# Patient Record
Sex: FEMALE | Race: ASIAN | NOT HISPANIC OR LATINO | Employment: FULL TIME | URBAN - METROPOLITAN AREA
[De-identification: names, ages, dates, MRNs, and addresses within clinical notes are randomized per-mention and may not be internally consistent; named-entity substitution may affect disease eponyms.]

---

## 2021-08-27 PROBLEM — Z37.9 NORMAL LABOR: Status: ACTIVE | Noted: 2021-08-27

## 2021-11-11 PROBLEM — R51.9 HEADACHE: Status: ACTIVE | Noted: 2020-06-12

## 2021-11-11 PROBLEM — J30.9 ALLERGIC RHINITIS: Status: ACTIVE | Noted: 2021-11-11

## 2022-12-05 ENCOUNTER — APPOINTMENT (OUTPATIENT)
Dept: GENERAL RADIOLOGY | Facility: HOSPITAL | Age: 27
End: 2022-12-05

## 2022-12-05 ENCOUNTER — HOSPITAL ENCOUNTER (EMERGENCY)
Facility: HOSPITAL | Age: 27
Discharge: HOME | End: 2022-12-05
Attending: EMERGENCY MEDICINE | Admitting: EMERGENCY MEDICINE

## 2022-12-05 VITALS
DIASTOLIC BLOOD PRESSURE: 62 MMHG | HEART RATE: 103 BPM | TEMPERATURE: 98.8 F | OXYGEN SATURATION: 99 % | RESPIRATION RATE: 18 BRPM | SYSTOLIC BLOOD PRESSURE: 101 MMHG | BODY MASS INDEX: 22.94 KG/M2 | HEIGHT: 60 IN | WEIGHT: 116.84 LBS

## 2022-12-05 DIAGNOSIS — J06.9 UPPER RESPIRATORY TRACT INFECTION, UNSPECIFIED TYPE: Primary | ICD-10-CM

## 2022-12-05 LAB
FLUAV AG NPH QL: NEGATIVE
FLUBV AG NPH QL IA: NEGATIVE
S PYO AG THROAT QL: NEGATIVE
SARS-COV-2 RNA PNL SPEC NAA+PROBE: NOT DETECTED

## 2022-12-05 PROCEDURE — 87081 CULTURE SCREEN ONLY: CPT | Performed by: EMERGENCY MEDICINE

## 2022-12-05 PROCEDURE — 87804 INFLUENZA ASSAY W/OPTIC: CPT | Performed by: EMERGENCY MEDICINE

## 2022-12-05 PROCEDURE — 87880 STREP A ASSAY W/OPTIC: CPT | Performed by: EMERGENCY MEDICINE

## 2022-12-05 PROCEDURE — U0004 COV-19 TEST NON-CDC HGH THRU: HCPCS | Performed by: EMERGENCY MEDICINE

## 2022-12-05 PROCEDURE — 99283 EMERGENCY DEPT VISIT LOW MDM: CPT

## 2022-12-05 PROCEDURE — 71045 X-RAY EXAM CHEST 1 VIEW: CPT

## 2022-12-05 RX ORDER — ACETAMINOPHEN 500 MG
500 TABLET ORAL ONCE
Status: COMPLETED | OUTPATIENT
Start: 2022-12-05 | End: 2022-12-05

## 2022-12-05 RX ORDER — ALBUTEROL SULFATE 90 UG/1
2 AEROSOL, METERED RESPIRATORY (INHALATION) 4 TIMES DAILY PRN
Qty: 18 G | Refills: 0 | Status: SHIPPED | OUTPATIENT
Start: 2022-12-05

## 2022-12-05 RX ORDER — GUAIFENESIN AND CODEINE PHOSPHATE 100; 10 MG/5ML; MG/5ML
5 SOLUTION ORAL 4 TIMES DAILY PRN
Qty: 118 ML | Refills: 0 | Status: SHIPPED | OUTPATIENT
Start: 2022-12-05

## 2022-12-05 RX ADMIN — ACETAMINOPHEN 500 MG: 500 TABLET, FILM COATED ORAL at 02:45

## 2022-12-05 NOTE — ED PROVIDER NOTES
Time: 6:52 AM EST    Chief Complaint: Fever  History provided by: Sidra  History is limited by: N/A     History of Present Illness:      Patient is a 37 y.o. year old female who presents to the emergency department with a fever. Patient reports that she returned from [facility] about 6 weeks ago. Patient reports body aches and a fever that has lasted couple days.      History provided by:  Patient   used: No        Similar Symptoms Previously: N/A  Recently seen: N/A      Patient Care Team  Primary Care Provider: Provider, No Known    Past Medical History:     No Known Allergies  History reviewed. No pertinent past medical history.  Past Surgical History:   Procedure Laterality Date   • HEAD/NECK LESION/CYST EXCISION       History reviewed. No pertinent family history.    Home Medications:  Prior to Admission medications    Not on File        Social History:   Social History     Tobacco Use   • Smoking status: Never   • Smokeless tobacco: Never   Substance Use Topics   • Alcohol use: Not Currently   • Drug use: Never         Review of Systems:  Review of Systems   Constitutional: Positive for fever. Negative for chills.   HENT: Negative for congestion, rhinorrhea and sore throat.    Eyes: Negative for pain and visual disturbance.   Respiratory: Negative for apnea, cough, chest tightness and shortness of breath.    Cardiovascular: Negative for chest pain and palpitations.   Gastrointestinal: Negative for abdominal pain, diarrhea, nausea and vomiting.   Genitourinary: Negative for difficulty urinating and dysuria.   Musculoskeletal: Positive for myalgias. Negative for joint swelling.   Skin: Negative for color change.   Neurological: Negative for seizures and headaches.   Psychiatric/Behavioral: Negative.    All other systems reviewed and are negative.       Physical Exam:  /73 (BP Location: Left arm, Patient Position: Sitting)   Pulse (!) 123   Temp (!) 100.8 °F (38.2 °C) (Oral)   Resp  "18   Ht 152.4 cm (60\")   Wt 53 kg (116 lb 13.5 oz)   LMP 11/28/2022 (Approximate)   SpO2 99%   BMI 22.82 kg/m²     Physical Exam  Vitals and nursing note reviewed.   Constitutional:       General: She is not in acute distress.     Appearance: Normal appearance. She is not toxic-appearing.   HENT:      Head: Normocephalic and atraumatic.      Jaw: There is normal jaw occlusion.   Eyes:      General: Lids are normal.      Extraocular Movements: Extraocular movements intact.      Conjunctiva/sclera: Conjunctivae normal.      Pupils: Pupils are equal, round, and reactive to light.   Cardiovascular:      Rate and Rhythm: Normal rate and regular rhythm.      Pulses: Normal pulses.      Heart sounds: Normal heart sounds.   Pulmonary:      Effort: Pulmonary effort is normal. No respiratory distress.      Breath sounds: Normal breath sounds. No wheezing or rhonchi.   Abdominal:      General: Abdomen is flat.      Palpations: Abdomen is soft.      Tenderness: There is no abdominal tenderness. There is no guarding or rebound.   Musculoskeletal:         General: Normal range of motion.      Cervical back: Normal range of motion and neck supple.      Right lower leg: No edema.      Left lower leg: No edema.   Skin:     General: Skin is warm and dry.   Neurological:      Mental Status: She is alert and oriented to person, place, and time. Mental status is at baseline.   Psychiatric:         Mood and Affect: Mood normal.                Medications in the Emergency Department:  Medications   acetaminophen (TYLENOL) tablet 500 mg (500 mg Oral Given 12/5/22 0245)        Labs  Lab Results (last 24 hours)     Procedure Component Value Units Date/Time    Influenza Antigen, Rapid - Swab, Nasopharynx [[id number]]  (Normal) Collected: 12/05/22 0239    Specimen: Swab from Nasopharynx Updated: 12/05/22 0323     Influenza A Ag, EIA Negative     Influenza B Ag, EIA Negative    COVID-19,APTIMA PANTHER(VICKIE),[facility], NP/OP SWAB IN " UTM/VTM/[facility] [employer] MEDIA,24 HR TAT - Swab, Nasopharynx [[id number]]  (Normal) Collected: 12/05/22 0239    Specimen: Swab from Nasopharynx Updated: 12/05/22 0628     COVID19 Not Detected    Narrative:      Fact sheet for providers: https://www.fda.gov/media/799299/download     Fact sheet for patients: https://www.fda.gov/media/889799/download    Test performed by RT PCR.    Rapid Strep A Screen - Swab, Throat [[id number]]  (Normal) Collected: 12/05/22 0531    Specimen: Swab from Throat Updated: 12/05/22 0545     Strep A Ag Negative    Beta Strep Culture, Throat - Swab, Throat [[id number]] Collected: 12/05/22 0531    Specimen: Swab from Throat Updated: 12/05/22 0545           Imaging:  XR Chest 1 View    Result Date: 12/5/2022  PROCEDURE: XR CHEST 1 VW  COMPARISON: None.  INDICATIONS: cough, fever  FINDINGS: A single frontal (AP or PA upright portable) chest radiograph reveals no cardiac enlargement and no acute infiltrate. No pneumothorax is seen.  There is mild lateral curvature of the thoracic spine with the convexity to the left, which may be fixed or positional in nature.       No acute cardiopulmonary disease is seen radiographically.     Please note that portions of this note were completed with a voice recognition program.  VIRY MIJARES JR, MD       Electronically Signed and Approved By: VIRY MIJARES JR, MD on 12/05/2022 at 6:55                Procedures:  Procedures    Progress                            Medical Decision Making:  MDM  Number of Diagnoses or Management Options  Upper respiratory tract infection, unspecified type  Diagnosis management comments: In summary this is a 37-year-old female who presents emergency room complaining of fever, cough, chills.  Chest x-ray is unremarkable.  Influenza test negative.  Strep and COVID-19 test also negative.  Patient is otherwise well-appearing and in no acute distress.  She is oxygenating 100% on room air.  She will be prescribed cough medicine and  inhaler for home treatment.  Very strict return to ER and follow-up instructions have been provided to the patient.         Final diagnoses:   Upper respiratory tract infection, unspecified type        Disposition:  ED Disposition     ED Disposition   Discharge    Condition   Stable    Comment   --             This medical record created using voice recognition software.      Documentation assistance provided by Cain John, acting as scribe for Lan Ayon MD. Information recorded by the scribe was done at my direction and has been verified and validated by me.       Cain John  12/05/22 0701       Lan Ayon MD  12/05/22 0750

## 2022-12-07 LAB — BACTERIA SPEC AEROBE CULT: NORMAL

## 2023-09-08 ENCOUNTER — OFFICE VISIT (OUTPATIENT)
Dept: FAMILY MEDICINE CLINIC | Facility: CLINIC | Age: 28
End: 2023-09-08
Payer: OTHER GOVERNMENT

## 2023-09-08 VITALS
TEMPERATURE: 97.6 F | SYSTOLIC BLOOD PRESSURE: 109 MMHG | WEIGHT: 126 LBS | HEIGHT: 61 IN | DIASTOLIC BLOOD PRESSURE: 70 MMHG | OXYGEN SATURATION: 100 % | HEART RATE: 71 BPM | BODY MASS INDEX: 23.79 KG/M2

## 2023-09-08 DIAGNOSIS — Z00.00 ANNUAL PHYSICAL EXAM: Primary | ICD-10-CM

## 2023-09-08 DIAGNOSIS — Z13.29 SCREENING FOR THYROID DISORDER: ICD-10-CM

## 2023-09-08 DIAGNOSIS — Z01.84 IMMUNITY STATUS TESTING: ICD-10-CM

## 2023-09-08 DIAGNOSIS — Z23 NEED FOR HPV VACCINATION: ICD-10-CM

## 2023-09-08 DIAGNOSIS — Z23 NEED FOR INFLUENZA VACCINATION: ICD-10-CM

## 2023-09-08 DIAGNOSIS — Z11.59 NEED FOR HEPATITIS C SCREENING TEST: ICD-10-CM

## 2023-09-11 ENCOUNTER — LAB (OUTPATIENT)
Dept: LAB | Facility: HOSPITAL | Age: 28
End: 2023-09-11
Payer: OTHER GOVERNMENT

## 2023-09-11 DIAGNOSIS — Z01.84 IMMUNITY STATUS TESTING: ICD-10-CM

## 2023-09-11 LAB
ALBUMIN SERPL-MCNC: 4.2 G/DL (ref 3.5–5.2)
ALBUMIN/GLOB SERPL: 1.2 G/DL
ALP SERPL-CCNC: 82 U/L (ref 39–117)
ALT SERPL W P-5'-P-CCNC: 26 U/L (ref 1–33)
ANION GAP SERPL CALCULATED.3IONS-SCNC: 10.8 MMOL/L (ref 5–15)
AST SERPL-CCNC: 25 U/L (ref 1–32)
BASOPHILS # BLD AUTO: 0.05 10*3/MM3 (ref 0–0.2)
BASOPHILS NFR BLD AUTO: 0.6 % (ref 0–1.5)
BILIRUB SERPL-MCNC: 0.3 MG/DL (ref 0–1.2)
BUN SERPL-MCNC: 10 MG/DL (ref 6–20)
BUN/CREAT SERPL: 13.5 (ref 7–25)
CALCIUM SPEC-SCNC: 9 MG/DL (ref 8.6–10.5)
CHLORIDE SERPL-SCNC: 104 MMOL/L (ref 98–107)
CO2 SERPL-SCNC: 24.2 MMOL/L (ref 22–29)
CREAT SERPL-MCNC: 0.74 MG/DL (ref 0.57–1)
DEPRECATED RDW RBC AUTO: 37.3 FL (ref 37–54)
EGFRCR SERPLBLD CKD-EPI 2021: 115.3 ML/MIN/1.73
EOSINOPHIL # BLD AUTO: 0.7 10*3/MM3 (ref 0–0.4)
EOSINOPHIL NFR BLD AUTO: 7.8 % (ref 0.3–6.2)
ERYTHROCYTE [DISTWIDTH] IN BLOOD BY AUTOMATED COUNT: 11.8 % (ref 12.3–15.4)
GLOBULIN UR ELPH-MCNC: 3.6 GM/DL
GLUCOSE SERPL-MCNC: 81 MG/DL (ref 65–99)
HCT VFR BLD AUTO: 40.9 % (ref 34–46.6)
HCV AB SER DONR QL: NORMAL
HGB BLD-MCNC: 13.6 G/DL (ref 12–15.9)
IMM GRANULOCYTES # BLD AUTO: 0.02 10*3/MM3 (ref 0–0.05)
IMM GRANULOCYTES NFR BLD AUTO: 0.2 % (ref 0–0.5)
LYMPHOCYTES # BLD AUTO: 2.08 10*3/MM3 (ref 0.7–3.1)
LYMPHOCYTES NFR BLD AUTO: 23.2 % (ref 19.6–45.3)
MCH RBC QN AUTO: 29.3 PG (ref 26.6–33)
MCHC RBC AUTO-ENTMCNC: 33.3 G/DL (ref 31.5–35.7)
MCV RBC AUTO: 88.1 FL (ref 79–97)
MONOCYTES # BLD AUTO: 0.58 10*3/MM3 (ref 0.1–0.9)
MONOCYTES NFR BLD AUTO: 6.5 % (ref 5–12)
NEUTROPHILS NFR BLD AUTO: 5.54 10*3/MM3 (ref 1.7–7)
NEUTROPHILS NFR BLD AUTO: 61.7 % (ref 42.7–76)
NRBC BLD AUTO-RTO: 0 /100 WBC (ref 0–0.2)
PLATELET # BLD AUTO: 285 10*3/MM3 (ref 140–450)
PMV BLD AUTO: 11.2 FL (ref 6–12)
POTASSIUM SERPL-SCNC: 3.4 MMOL/L (ref 3.5–5.2)
PROT SERPL-MCNC: 7.8 G/DL (ref 6–8.5)
RBC # BLD AUTO: 4.64 10*6/MM3 (ref 3.77–5.28)
SODIUM SERPL-SCNC: 139 MMOL/L (ref 136–145)
TSH SERPL DL<=0.05 MIU/L-ACNC: 1.61 UIU/ML (ref 0.27–4.2)
WBC NRBC COR # BLD: 8.97 10*3/MM3 (ref 3.4–10.8)

## 2023-09-11 PROCEDURE — 80074 ACUTE HEPATITIS PANEL: CPT | Performed by: NURSE PRACTITIONER

## 2023-09-11 PROCEDURE — 86704 HEP B CORE ANTIBODY TOTAL: CPT

## 2023-09-11 PROCEDURE — 86706 HEP B SURFACE ANTIBODY: CPT

## 2023-09-11 PROCEDURE — 86762 RUBELLA ANTIBODY: CPT

## 2023-09-11 PROCEDURE — 86480 TB TEST CELL IMMUN MEASURE: CPT

## 2023-09-11 PROCEDURE — 84443 ASSAY THYROID STIM HORMONE: CPT | Performed by: NURSE PRACTITIONER

## 2023-09-11 PROCEDURE — 85025 COMPLETE CBC W/AUTO DIFF WBC: CPT | Performed by: NURSE PRACTITIONER

## 2023-09-11 PROCEDURE — 36415 COLL VENOUS BLD VENIPUNCTURE: CPT

## 2023-09-11 PROCEDURE — 80053 COMPREHEN METABOLIC PANEL: CPT | Performed by: NURSE PRACTITIONER

## 2023-09-11 PROCEDURE — 86765 RUBEOLA ANTIBODY: CPT

## 2023-09-11 PROCEDURE — 86735 MUMPS ANTIBODY: CPT

## 2023-09-12 ENCOUNTER — TELEPHONE (OUTPATIENT)
Dept: FAMILY MEDICINE CLINIC | Facility: CLINIC | Age: 28
End: 2023-09-12
Payer: OTHER GOVERNMENT

## 2023-09-12 DIAGNOSIS — E87.6 LOW SERUM POTASSIUM: Primary | ICD-10-CM

## 2023-09-12 DIAGNOSIS — E87.5 SERUM POTASSIUM ELEVATED: Primary | ICD-10-CM

## 2023-09-12 LAB
MEV IGG SER IA-ACNC: >300 AU/ML
MUV IGG SER IA-ACNC: 33.4 AU/ML
RUBV IGG SERPL IA-ACNC: 7.63 INDEX

## 2023-09-13 LAB
GAMMA INTERFERON BACKGROUND BLD IA-ACNC: 0.17 IU/ML
M TB IFN-G BLD-IMP: NEGATIVE
M TB IFN-G CD4+ T-CELLS BLD-ACNC: 0.1 IU/ML
M TBIFN-G CD4+ CD8+T-CELLS BLD-ACNC: 0.12 IU/ML
MITOGEN IGNF BLD-ACNC: >10 IU/ML
QUANTIFERON INCUBATION: NORMAL
SERVICE CMNT-IMP: NORMAL
SPECIMEN STATUS: NORMAL

## 2023-09-14 LAB
HAV AB SER QL IA: POSITIVE
HAV IGM SERPL QL IA: NEGATIVE
HBV CORE AB SERPL QL IA: NEGATIVE
HBV CORE IGM SERPL QL IA: NEGATIVE
HBV SURFACE AB SER QL: NON REACTIVE
HBV SURFACE AG SERPL QL IA: NEGATIVE

## 2024-09-03 ENCOUNTER — CLINICAL SUPPORT (OUTPATIENT)
Dept: FAMILY MEDICINE CLINIC | Facility: CLINIC | Age: 29
End: 2024-09-03
Payer: OTHER GOVERNMENT

## 2024-09-03 DIAGNOSIS — Z11.1 SCREENING-PULMONARY TB: Primary | ICD-10-CM

## 2024-09-03 PROCEDURE — 86580 TB INTRADERMAL TEST: CPT | Performed by: NURSE PRACTITIONER

## 2024-09-05 ENCOUNTER — HOSPITAL ENCOUNTER (OUTPATIENT)
Dept: GENERAL RADIOLOGY | Facility: HOSPITAL | Age: 29
Discharge: HOME | End: 2024-09-05
Admitting: NURSE PRACTITIONER
Payer: OTHER GOVERNMENT

## 2024-09-05 ENCOUNTER — CLINICAL SUPPORT (OUTPATIENT)
Dept: FAMILY MEDICINE CLINIC | Facility: CLINIC | Age: 29
End: 2024-09-05
Payer: OTHER GOVERNMENT

## 2024-09-05 DIAGNOSIS — Z11.1 SCREENING-PULMONARY TB: ICD-10-CM

## 2024-09-05 LAB
INDURATION: 25 MM (ref 0–10)
Lab: ABNORMAL
Lab: ABNORMAL
TB SKIN TEST: ABNORMAL

## 2024-09-05 PROCEDURE — 71046 X-RAY EXAM CHEST 2 VIEWS: CPT

## 2024-10-18 ENCOUNTER — OFFICE VISIT (OUTPATIENT)
Dept: FAMILY MEDICINE CLINIC | Facility: CLINIC | Age: 29
End: 2024-10-18
Payer: OTHER GOVERNMENT

## 2024-10-18 VITALS
SYSTOLIC BLOOD PRESSURE: 100 MMHG | WEIGHT: 131 LBS | BODY MASS INDEX: 24.73 KG/M2 | TEMPERATURE: 98.3 F | HEIGHT: 61 IN | DIASTOLIC BLOOD PRESSURE: 69 MMHG | OXYGEN SATURATION: 99 % | HEART RATE: 76 BPM

## 2024-10-18 DIAGNOSIS — Z00.00 ANNUAL PHYSICAL EXAM: Primary | ICD-10-CM

## 2024-10-18 DIAGNOSIS — Z13.29 SCREENING FOR THYROID DISORDER: ICD-10-CM

## 2024-10-18 DIAGNOSIS — R76.11 POSITIVE TB TEST: ICD-10-CM

## 2024-10-18 PROCEDURE — 99395 PREV VISIT EST AGE 18-39: CPT | Performed by: NURSE PRACTITIONER

## 2024-10-18 RX ORDER — SPIRONOLACTONE 100 MG/1
100 TABLET, FILM COATED ORAL DAILY
COMMUNITY

## 2024-10-30 NOTE — PROGRESS NOTES
"     Female Physical / PAP Note      Patient Name: Rob Winn  : 1995   MRN: [id number]     Chief Complaint:    Chief Complaint   Patient presents with    Gynecologic Exam       History of Present Illness: Rob Winn is a 28 y.o. female who is here today for her annual health maintenance and physical.       Pap- 2021  Lmp- 10/18/2024      Subjective      Review of Systems:   Review of Systems   Constitutional:  Negative for fever.   Respiratory:  Negative for cough.    Cardiovascular:  Negative for chest pain.   Gastrointestinal:  Negative for abdominal pain, constipation, diarrhea, nausea and vomiting.   Genitourinary:  Negative for dysuria and pelvic pain.   Skin:  Negative for rash.      Breast - No tenderness, lumps, discharge, or blood from nipples.      Past Medical History, Social History, Family History and Care Team were all reviewed with patient and updated as appropriate.     Medications:     Current Outpatient Medications:     albuterol sulfate  (90 Base) MCG/ACT inhaler, Inhale 2 puffs 4 (Four) Times a Day As Needed for Wheezing for up to 1 dose., Disp: 18 g, Rfl: 0    spironolactone (ALDACTONE) 100 MG tablet, Take 1 tablet by mouth Daily., Disp: , Rfl:     Allergies:   No Known Allergies        Objective     Physical Exam:  Vital Signs:   Vitals:    24 1143   BP: 98/69   Pulse: 69   Temp: 97.2 °F (36.2 °C)   SpO2: 98%   Weight: 60.3 kg (133 lb)   Height: 154.9 cm (61\")     Body mass index is 25.13 kg/m².   BMI is within normal parameters. No other follow-up for BMI required.       Physical Exam  Cardiovascular:      Rate and Rhythm: Normal rate and regular rhythm.      Heart sounds: Normal heart sounds. No murmur heard.  Pulmonary:      Effort: Pulmonary effort is normal.   Chest:   Breasts:     Right: Normal.      Left: Normal.   Abdominal:      General: Bowel sounds are normal.      Palpations: Abdomen is soft.   Genitourinary:     General: Normal vulva.      Vagina: " "Normal. Vaginal discharge present.      Cervix: Normal.      Uterus: Normal.       Adnexa: Right adnexa normal and left adnexa normal.      Rectum: Normal.      Comments: Thin white discharge  Lymphadenopathy:      Upper Body:      Right upper body: No supraclavicular, axillary or pectoral adenopathy.      Left upper body: No supraclavicular, axillary or pectoral adenopathy.   Skin:     General: Skin is warm and dry.   Neurological:      Mental Status: She is alert.           Assessment / Plan      Assessment/Plan:   Diagnoses and all orders for this visit:    1. Encounter for well woman exam with routine gynecological exam (Primary)    2. Screening for malignant neoplasm of the cervix  -     IgP, Aptima HPV    3. Vaginal discharge  -     Gardnerella vaginalis, Trichomonas vaginalis, Candida albicans, DNA - Swab, Vagina; Future  -     Gardnerella vaginalis, Trichomonas vaginalis, Candida albicans, DNA - Swab, Vagina         encounter for well woman exam pelvic exam completed Pap smear collected to screen for cervical cancer  Vaginal discharge vaginal screen collected we will call with results and further recommendations      Follow Up:   Return in about 1 year (around 11/1/2025).    Healthcare Maintenance:   Counseling provided on screening mammogram screening colonoscopy screening Pap smear diet and exercise  Rob Winn voices understanding and acceptance of this advice and will call back with any further questions or concerns. AVS with preventive healthcare tips printed for patient.     ROBERTA Gamboa    \"Please note that portions of this note were completed with a voice recognition program.\"    "

## 2024-11-01 ENCOUNTER — OFFICE VISIT (OUTPATIENT)
Dept: FAMILY MEDICINE CLINIC | Facility: CLINIC | Age: 29
End: 2024-11-01
Payer: OTHER GOVERNMENT

## 2024-11-01 VITALS
BODY MASS INDEX: 25.11 KG/M2 | HEART RATE: 69 BPM | TEMPERATURE: 97.2 F | DIASTOLIC BLOOD PRESSURE: 69 MMHG | HEIGHT: 61 IN | OXYGEN SATURATION: 98 % | SYSTOLIC BLOOD PRESSURE: 98 MMHG | WEIGHT: 133 LBS

## 2024-11-01 DIAGNOSIS — Z01.419 ENCOUNTER FOR WELL WOMAN EXAM WITH ROUTINE GYNECOLOGICAL EXAM: Primary | ICD-10-CM

## 2024-11-01 DIAGNOSIS — Z12.4 SCREENING FOR MALIGNANT NEOPLASM OF THE CERVIX: ICD-10-CM

## 2024-11-01 DIAGNOSIS — N89.8 VAGINAL DISCHARGE: ICD-10-CM

## 2024-11-01 LAB
CANDIDA SPECIES: NEGATIVE
GARDNERELLA VAGINALIS: POSITIVE
T VAGINALIS DNA VAG QL PROBE+SIG AMP: NEGATIVE

## 2024-11-01 PROCEDURE — 87480 CANDIDA DNA DIR PROBE: CPT | Performed by: NURSE PRACTITIONER

## 2024-11-01 PROCEDURE — G0123 SCREEN CERV/VAG THIN LAYER: HCPCS | Performed by: NURSE PRACTITIONER

## 2024-11-01 PROCEDURE — 87624 HPV HI-RISK TYP POOLED RSLT: CPT | Performed by: NURSE PRACTITIONER

## 2024-11-01 PROCEDURE — 87510 GARDNER VAG DNA DIR PROBE: CPT | Performed by: NURSE PRACTITIONER

## 2024-11-01 PROCEDURE — 87660 TRICHOMONAS VAGIN DIR PROBE: CPT | Performed by: NURSE PRACTITIONER

## 2024-11-01 RX ORDER — METRONIDAZOLE 500 MG/1
500 TABLET ORAL 2 TIMES DAILY
Qty: 14 TABLET | Refills: 0 | Status: SHIPPED | OUTPATIENT
Start: 2024-11-01

## 2024-11-11 LAB
CYTOLOGIST CVX/VAG CYTO: NORMAL
CYTOLOGY CVX/VAG DOC CYTO: NORMAL
CYTOLOGY CVX/VAG DOC THIN PREP: NORMAL
DX ICD CODE: NORMAL
HPV I/H RISK 4 DNA CVX QL PROBE+SIG AMP: NEGATIVE
Lab: NORMAL
OTHER STN SPEC: NORMAL
STAT OF ADQ CVX/VAG CYTO-IMP: NORMAL